# Patient Record
(demographics unavailable — no encounter records)

---

## 2025-04-30 NOTE — HISTORY OF PRESENT ILLNESS
[de-identified] : Patient with history of food allergies:  Walnuts - age 20 - she felt throat constriction - she took Benadryl - vomiting - she felt better after vomiting - she has avoided all nuts since that time including peanuts.    Patient reports with dairy ingestion she will get stomach ache and diarrhea - she is being worked up for this by GI   Doxycylcine: rash all over her body - age 27  Sulfa: rash with swelling hands and feet - age 28

## 2025-04-30 NOTE — SOCIAL HISTORY
[House] : [unfilled] lives in a house  [Cat] : cat [Single] : single [FreeTextEntry1] : On line Brenda Perez  Lives with mother  [Smokers in Household] : there are no smokers in the home [de-identified] : intermittent

## 2025-04-30 NOTE — SOCIAL HISTORY
[House] : [unfilled] lives in a house  [Cat] : cat [Single] : single [FreeTextEntry1] : On line Brenda Perez  Lives with mother  [Smokers in Household] : there are no smokers in the home [de-identified] : intermittent

## 2025-04-30 NOTE — PHYSICAL EXAM
[Alert] : alert [Well Nourished] : well nourished [No Acute Distress] : no acute distress [Well Developed] : well developed [No Neck Mass] : no neck mass was observed [No LAD] : no lymphadenopathy [No Retractions] : no retractions [Wheezing] : no wheezing was heard [Normal Rate] : heart rate was normal  [Normal S1, S2] : normal S1 and S2 [Regular Rhythm] : with a regular rhythm [No Rash] : no rash [Normal Mood] : mood was normal [Judgment and Insight Age Appropriate] : judgement and insight is age appropriate

## 2025-04-30 NOTE — ASSESSMENT
[FreeTextEntry1] : Food allergies - walnut   ImmunoCAP almond, Brazil nut, cashew, cashew components, hazelnut, pistachio, pecan, macadamia, walnut, walnut components, pine nut, peanut, peanut components EpiPen renewed.   Patient instructed on the proper use of an EpiPen - precautions - follow up after use of EpiPen - need to have device available at all times Patient instructed to have Benadryl, in addition to EpiPen, available at all times - reviewed indications for use of Benadryl - dosing - precautions and follow up.

## 2025-04-30 NOTE — HISTORY OF PRESENT ILLNESS
[de-identified] : Patient with history of food allergies:  Walnuts - age 20 - she felt throat constriction - she took Benadryl - vomiting - she felt better after vomiting - she has avoided all nuts since that time including peanuts.    Patient reports with dairy ingestion she will get stomach ache and diarrhea - she is being worked up for this by GI   Doxycylcine: rash all over her body - age 27  Sulfa: rash with swelling hands and feet - age 28

## 2025-07-17 NOTE — ASSESSMENT
[FreeTextEntry1] : Will consider getting this patient in to See Dr. Oliveros. Reached out w a synopsis asking him to get her in.  She is paying attention to food and beverage triggers. Drinks A LOT of coffee and also water. normal bowels Takes a powder in liquid probiotic for vaginal health.  Actually admits she feels no difference when given antibiotics.  We did review need to cut down on coffee, artificial sweeteners, inc water intake.  Continue to void before and after relations Maintain regular bowels Probiotic  33 min spent with patient.

## 2025-07-17 NOTE — HISTORY OF PRESENT ILLNESS
[FreeTextEntry1] : URBANO MCPHERSON is a 29 year old F who presents with c/o dysuria even with no infection. She does have Rheumatoid and psoriatic arthritis, asymptomatic, w only occ outbreaks of Psoriasis on scalp and neck.  Takes only probiotics and occ anti inflammatory pain med.  Drinks almost a gallon and voids q2-3 hrs no h/o gross hematuria, stones, UTI or febrile UTI  No STD's one partner currently and no STD's  In past was having frequent ureaplasma/mycoplasma